# Patient Record
Sex: FEMALE | URBAN - NONMETROPOLITAN AREA
[De-identification: names, ages, dates, MRNs, and addresses within clinical notes are randomized per-mention and may not be internally consistent; named-entity substitution may affect disease eponyms.]

---

## 2018-03-06 ENCOUNTER — APPOINTMENT (RX ONLY)
Dept: URBAN - NONMETROPOLITAN AREA CLINIC 35 | Facility: CLINIC | Age: 43
Setting detail: DERMATOLOGY
End: 2018-03-06

## 2018-03-06 DIAGNOSIS — L85.3 XEROSIS CUTIS: ICD-10-CM

## 2018-03-06 DIAGNOSIS — Z41.9 ENCOUNTER FOR PROCEDURE FOR PURPOSES OTHER THAN REMEDYING HEALTH STATE, UNSPECIFIED: ICD-10-CM

## 2018-03-06 PROCEDURE — 99202 OFFICE O/P NEW SF 15 MIN: CPT

## 2018-03-06 PROCEDURE — ? COSMETIC CONSULTATION: HAIR REMOVAL

## 2018-03-06 PROCEDURE — ? COUNSELING

## 2018-03-06 PROCEDURE — ? IN-HOUSE DISPENSING PHARMACY

## 2018-03-06 ASSESSMENT — LOCATION DETAILED DESCRIPTION DERM
LOCATION DETAILED: RIGHT INFERIOR CENTRAL MALAR CHEEK
LOCATION DETAILED: RIGHT UPPER CUTANEOUS LIP
LOCATION DETAILED: RIGHT MID-UPPER BACK
LOCATION DETAILED: INFERIOR THORACIC SPINE
LOCATION DETAILED: LEFT UPPER CUTANEOUS LIP

## 2018-03-06 ASSESSMENT — LOCATION ZONE DERM
LOCATION ZONE: LIP
LOCATION ZONE: FACE
LOCATION ZONE: TRUNK

## 2018-03-06 ASSESSMENT — LOCATION SIMPLE DESCRIPTION DERM
LOCATION SIMPLE: UPPER BACK
LOCATION SIMPLE: RIGHT UPPER BACK
LOCATION SIMPLE: LEFT LIP
LOCATION SIMPLE: RIGHT LIP
LOCATION SIMPLE: RIGHT CHEEK

## 2018-03-06 NOTE — PROCEDURE: IN-HOUSE DISPENSING PHARMACY
Product 16 Amount/Unit (Numbers Only): 30
Product 12 Unit Type: ml
Product 47 Price/Unit (In Dollars): 0
Product 19 Price/Unit (In Dollars): 40.00
Product 54 Unit Type: mg
Product 10 Application Directions: Apply 1-2 pumps topically to affected areas on body bid weekly alternating with tacrolimus
Product 18 Price/Unit (In Dollars): 45.00
Product 2 Price/Unit (In Dollars): 48.00
Detail Level: Detailed
Name Of Product 16: Metron 1% Rosacea Gel\\nMetronidazole 1%/Niacinamide 4%
Product 2 Application Directions: Apply 1-2 pumps topically to face every morning
Product 24 Price/Unit (In Dollars): 58.00
Product 23 Price/Unit (In Dollars): 53.00
Product 7 Application Directions: Apply to affected toenails once daily
Product 6 Refills: 11
Product 11 Refills: 1
Product 10 Amount/Unit (Numbers Only): 60
Name Of Product 22: Acne Rosacea Face & Body Cleanser\\nSodium Sulfacetamide 8%/Sulfur 4%
Name Of Product 8: Anti-Fungal Econaz 1% Cream\\nEconazole Nitrate 1%/Niacinamide 4%
Product 23 Application Directions: Apply topically qd x3 weeks post EDC
Product 3 Application Directions: Apply 1-2 pumps topically to affected areas on face once daily
Product 25 Application Directions: Apply to face 1-2 x daily
Name Of Product 10: Clob 0.05% Ointment\\nClobetasol Propionate 0.05%/Niacinamide 4%
Product 22 Amount/Unit (Numbers Only): 120
Product 3 Refills: 5
Product 8 Refills: 2
Name Of Product 25: Azelaic Acid Cream
Product 9 Asencio/Unit (In Dollars): 42.00
Name Of Product 5: Alopecia Topical Solution for Men HP\\nFinasteride 0.1%/Minoxidil 7%
Name Of Product 15: Hydrocort 2.5%/Keto 2% Fungal Cream\\nHydrocortisone 2.5%/Ketoconazole 2%
Name Of Product 2: Dap 6% Acne Gel\\nDapsone 6%/Niacinamide 4%
Name Of Product 18: Scar Silicone Gel\\nPentoxifylline 0.5%/Tranilast 1%/Triamcinolone Acetonide 0.1%/Zinc Oxide 5%
Product 4 Application Directions: Apply to face nightly or as tolerated
Name Of Product 21: Tret 0.05% w/ HA gel\\nHyaluronic Acid 0.5%/Niacinamide 4%/Tretinoin 0.05%
Name Of Product 17: Ivermec 1%/Metron 1% Rosacea Gel\\nIvermectin 1%/Metronidazole 1%/Niacinamide 4%/Potassium Azeloyl Diglycinate 5%
Product 5 Application Directions: Use on scalp 1-2 x daily
Name Of Product 7: Anti-Fungal Nail Solution\\nCiclopirox 8%/Fluconazole 1%/Terbinafine 1%
Product 1 Application Directions: apply pea sized amount to face qhs.
Name Of Product 13: Anti-Fungal Keto 2% Shampoo\\nKetoconazole 2%/Zinc Pyrithione 1%/Salicylic Acid 2%
Name Of Product 20: Bruise Healing Cream\\nArnica 2%/Ascorbic Acid 20%/Bromelain 5%/Niacinamide 4%/Phytonadione 1%
Name Of Product 11: Desox 0.025% Ointment\\nDesoximatasone 0.25%/Niacinamide 4%
Product 11 Application Directions: AAA bid
Name Of Product 9: Clob 0.05% Topical Solution\\nClobetasol Propionate 0.05%/Niacinamide 4%
Name Of Product 23: AK Imiquim 5%/Levocetir 1%\\nImiquimod 5%/Levocetirizine 1%/Niacinamide 2%
Name Of Product 1: tri radiant
Render Refills If Set To 0: Yes
Name Of Product 12: Mometasone 0.1% w/ HA\\nHyaluronic Acid 2%/Mometasone Furoate 0.1%/Niacinamide 4%
Product 12 Application Directions: Apply 1-2 pumps topically to affected areas twice daily for 2 weeks, breaking 1 week before repeating as needed for flares. Avoid face.
Product 16 Application Directions: Apply 1-2 pumps topically to affected areas on face 1-2 x daily as tolerated
Product 6 Application Directions: BID prn
Product 22 Application Directions: Use as acne wash qd-bid
Name Of Product 3: Lester 5% w/ Dap 6% Gel\\nDapsone 6%/Niacinamide 2%/Spironolactone 5%
Name Of Product 14: Tacro 0.1% Ointment\\nNiacinamide 4%/Tacrolimus 0.1%
Name Of Product 4: BP 5% w/ Tret 0.025% Gel\\nBenzoyl Peroxide 5%/Niacinamide 2%/Tretinoin 0.025%
Product 24 Application Directions: Apply topically to the affected areas BID prn
Product 15 Application Directions: AAA BID prn
Product 19 Refills: 3
Product 21 Application Directions: Apply topically to face nightly or as tolerated.
Product 7 Amount/Unit (Numbers Only): 15
Product 17 Application Directions: Apply 1-2 pumps to AA on face qam
Product 19 Application Directions: Apply 1-2 pumps topically to affected areas on foot nightly, washing off in AM.
Product 10 Unit Type: grams
Product 9 Application Directions: Apply to scalp bid prn
Product 8 Application Directions: Apply bid to rash until clear then one week longer
Product 24 Refills: 4
Product 5 Refills: 6
Product 13 Application Directions: Apply to affected areas in the shower 2-3 times a week. Let sit for several minutes before rinsing off thoroughly
Name Of Product 19: Wart Gel\\nCimetidine 5%/Ibuprofen 2%/Salicylic Acid 17%
Product 14 Application Directions: Apply to affected areas twice daily alternating with Clobetasol ointment weekly
Name Of Product 6: Alopecia Topical Solution for Women HP\\nMinoxidil 7%/Progesterone 0.1%
Name Of Product 24: Triamcinolone Cream\\nTriamcinolone acetonide 0.1%/Niacinamide 4%

## 2018-03-06 NOTE — HPI: DRY SKIN
How Severe Is Your Dry Skin?: moderate
Additional History: Pt reports dry, itchy, red skin on her face and back. Pt goes to the [facility] to swim QD with her children. Pt tries to apply moisturizer QD after showering but she can't always get it on her back. Pt has tried a Rx of her 's that he got here and said it helps but she can't remember the name of it.

## 2019-08-07 ENCOUNTER — APPOINTMENT (RX ONLY)
Dept: URBAN - NONMETROPOLITAN AREA CLINIC 35 | Facility: CLINIC | Age: 44
Setting detail: DERMATOLOGY
End: 2019-08-07

## 2019-08-07 DIAGNOSIS — L26 EXFOLIATIVE DERMATITIS: ICD-10-CM

## 2019-08-07 DIAGNOSIS — R20.2 PARESTHESIA OF SKIN: ICD-10-CM

## 2019-08-07 PROBLEM — L85.3 XEROSIS CUTIS: Status: ACTIVE | Noted: 2019-08-07

## 2019-08-07 PROCEDURE — 99212 OFFICE O/P EST SF 10 MIN: CPT

## 2019-08-07 PROCEDURE — ? COUNSELING

## 2019-08-07 PROCEDURE — ? TREATMENT REGIMEN

## 2019-08-07 PROCEDURE — ? PRESCRIPTION

## 2019-08-07 RX ORDER — TRIAMCINOLONE ACETONIDE 1 MG/G
OINTMENT TOPICAL
Qty: 1 | Refills: 1 | Status: ERX | COMMUNITY
Start: 2019-08-07

## 2019-08-07 RX ADMIN — TRIAMCINOLONE ACETONIDE: 1 OINTMENT TOPICAL at 16:38

## 2019-08-07 ASSESSMENT — LOCATION ZONE DERM
LOCATION ZONE: HAND
LOCATION ZONE: TRUNK

## 2019-08-07 ASSESSMENT — LOCATION DETAILED DESCRIPTION DERM
LOCATION DETAILED: RIGHT ULNAR PALM
LOCATION DETAILED: LEFT THENAR EMINENCE
LOCATION DETAILED: RIGHT MID-UPPER BACK

## 2019-08-07 ASSESSMENT — LOCATION SIMPLE DESCRIPTION DERM
LOCATION SIMPLE: RIGHT HAND
LOCATION SIMPLE: LEFT HAND
LOCATION SIMPLE: RIGHT UPPER BACK

## 2019-08-07 NOTE — HPI: DRY SKIN
How Severe Is Your Dry Skin?: mild
Additional History: Pt has been using O'Keeffe's moisturizing cream on her hands.  Pt feels like in the past the cream has worked really well, however over the last month her hands have been extra dry and peeling excessively.  Pt is concerned, and would like to know if there are any treatment options.

## 2019-08-07 NOTE — PROCEDURE: TREATMENT REGIMEN
Initiate Treatment: Excipial 20% urea cream after handwashing\\nTAC 0.1% ointment only if hands feel itchy or irritated
Otc Regimen: I recommended regular use of moisturizers after showering or swimming in particular
Detail Level: Simple
Plan: Follow up if not improving over the next 1-2 months, sooner prn if symptoms worsen
Continue Regimen: TAC 0.1% ointment as needed for flare ups/itch
Samples Given: Vanicream HC

## 2022-01-17 ENCOUNTER — APPOINTMENT (RX ONLY)
Dept: URBAN - NONMETROPOLITAN AREA CLINIC 34 | Facility: CLINIC | Age: 47
Setting detail: DERMATOLOGY
End: 2022-01-17

## 2022-01-17 DIAGNOSIS — L81.1 CHLOASMA: ICD-10-CM

## 2022-01-17 PROCEDURE — ? INVENTORY
